# Patient Record
Sex: FEMALE
[De-identification: names, ages, dates, MRNs, and addresses within clinical notes are randomized per-mention and may not be internally consistent; named-entity substitution may affect disease eponyms.]

---

## 2023-05-09 ENCOUNTER — NURSE TRIAGE (OUTPATIENT)
Dept: OTHER | Facility: CLINIC | Age: 43
End: 2023-05-09

## 2023-05-09 NOTE — TELEPHONE ENCOUNTER
Location of patient: [place]    Received call from Casey at [facility] with Red Flag Complaint. Colten Meng MRN: [id number]    Provider: Linwood Denis MD    Subjective: Caller states Sherry Delaney been having a weird sensation on the left side of the chest\"     Current Symptoms: intermittent left sided chest pains. Lasting only a few minutes, no more than 5 minutes. Started one month ago intermittently     Associated Symptoms: NA    Pain Severity: varies in intensity 2-5-10 poking; intermittent    Temperature: no     What has been tried: NA    Recommended disposition: See in Office Today    Care advice provided, patient verbalizes understanding; denies any other questions or concerns.     No Appointments available, patient referred to Emergency Department      This triage is a result of a call to the [facility]      Reason for Disposition   Chest pain(s) lasting a few seconds persists > 3 days    Protocols used: Chest Pain-ADULT-[place]